# Patient Record
Sex: MALE | Race: WHITE | NOT HISPANIC OR LATINO | URBAN - METROPOLITAN AREA
[De-identification: names, ages, dates, MRNs, and addresses within clinical notes are randomized per-mention and may not be internally consistent; named-entity substitution may affect disease eponyms.]

---

## 2021-04-08 DIAGNOSIS — Z23 ENCOUNTER FOR IMMUNIZATION: ICD-10-CM

## 2025-06-19 ENCOUNTER — TELEPHONE (OUTPATIENT)
Age: 70
End: 2025-06-19

## 2025-06-19 NOTE — TELEPHONE ENCOUNTER
New Patient      Insurance   Current Insurance?BCBS   Insurance E-verified? Y    History   Reason for appointment/active symptoms?  ROSSANA, Enlarged prostate     Has the patient had any previous Urologist(s)? [facility]      Was the patient seen in the ED? N     Labs/Imaging(Including Out Of Network)? Y     Records Requested? N  Records Visible in EPIC? Y      Personal history of cancer? N     Appointment   Office location preference:  [place]  ?   Appointment Details   Date:  7/16  Time:  8:30 AM  Location:  [place]  Provider:  Gavino  Does the appointment need further review? N

## 2025-07-02 RX ORDER — ALLOPURINOL 300 MG/1
300 TABLET ORAL DAILY
COMMUNITY
Start: 2025-06-17

## 2025-07-02 RX ORDER — FLUTICASONE PROPIONATE 50 MCG
2 SPRAY, SUSPENSION (ML) NASAL DAILY
COMMUNITY

## 2025-07-02 RX ORDER — PRAVASTATIN SODIUM 20 MG
20 TABLET ORAL DAILY
COMMUNITY
Start: 2025-04-14

## 2025-07-02 RX ORDER — USTEKINUMAB 90 MG/ML
90 INJECTION, SOLUTION SUBCUTANEOUS
COMMUNITY
Start: 2025-05-09

## 2025-07-02 RX ORDER — TADALAFIL 5 MG/1
5 TABLET ORAL DAILY
COMMUNITY
Start: 2025-03-10 | End: 2026-03-05

## 2025-07-02 RX ORDER — DIAZEPAM 5 MG/1
5 TABLET ORAL EVERY 6 HOURS PRN
COMMUNITY
Start: 2025-05-20 | End: 2025-07-15 | Stop reason: ALTCHOICE

## 2025-07-02 RX ORDER — TAMSULOSIN HYDROCHLORIDE 0.4 MG/1
0.8 CAPSULE ORAL
COMMUNITY
Start: 2025-02-28 | End: 2026-02-28

## 2025-07-02 RX ORDER — LEVOTHYROXINE SODIUM 50 UG/1
50 TABLET ORAL DAILY
COMMUNITY
Start: 2025-03-10

## 2025-07-16 ENCOUNTER — OFFICE VISIT (OUTPATIENT)
Dept: UROLOGY | Facility: MEDICAL CENTER | Age: 70
End: 2025-07-16
Payer: COMMERCIAL

## 2025-07-16 VITALS
HEIGHT: 73 IN | SYSTOLIC BLOOD PRESSURE: 118 MMHG | DIASTOLIC BLOOD PRESSURE: 60 MMHG | BODY MASS INDEX: 25.71 KG/M2 | OXYGEN SATURATION: 97 % | WEIGHT: 194 LBS | HEART RATE: 77 BPM

## 2025-07-16 DIAGNOSIS — R97.20 ELEVATED PSA: ICD-10-CM

## 2025-07-16 DIAGNOSIS — N40.1 BPH WITH LOWER URINARY TRACT SYMPTOMS WITHOUT URINARY OBSTRUCTION: Primary | ICD-10-CM

## 2025-07-16 DIAGNOSIS — Z80.42 FAMILY HISTORY OF PROSTATE CANCER: ICD-10-CM

## 2025-07-16 LAB
POST-VOID RESIDUAL VOLUME, ML POC: 36 ML
SL AMB  POCT GLUCOSE, UA: ABNORMAL
SL AMB LEUKOCYTE ESTERASE,UA: ABNORMAL
SL AMB POCT BILIRUBIN,UA: ABNORMAL
SL AMB POCT BLOOD,UA: ABNORMAL
SL AMB POCT CLARITY,UA: CLEAR
SL AMB POCT COLOR,UA: YELLOW
SL AMB POCT KETONES,UA: ABNORMAL
SL AMB POCT NITRITE,UA: ABNORMAL
SL AMB POCT PH,UA: 6
SL AMB POCT SPECIFIC GRAVITY,UA: 1.02
SL AMB POCT URINE PROTEIN: ABNORMAL
SL AMB POCT UROBILINOGEN: 0.2

## 2025-07-16 PROCEDURE — 51798 US URINE CAPACITY MEASURE: CPT | Performed by: UROLOGY

## 2025-07-16 PROCEDURE — 99204 OFFICE O/P NEW MOD 45 MIN: CPT | Performed by: UROLOGY

## 2025-07-16 PROCEDURE — 81003 URINALYSIS AUTO W/O SCOPE: CPT | Performed by: UROLOGY

## 2025-07-16 NOTE — LETTER
2025     No Recipients    Patient: Lucy Antunez   YOB: 1955   Date of Visit: 2025       Dear Dr. Jaquez Recipients:    Thank you for referring Lucy Antunez to me for evaluation. Below are my notes for this consultation.    If you have questions, please do not hesitate to call me. I look forward to following your patient along with you.         Sincerely,        Ozzie Mancia MD        CC: No Recipients    Ozzie Mancia MD  2025  9:31 AM  Sign when Signing Visit  Name: Lucy Antunez      : 1955      MRN: [id number]  Encounter Provider: Ozzie Mancia MD  Encounter Date: 2025   Encounter department: [facility] UROLOGY [place]  :  Assessment & Plan  BPH with lower urinary tract symptoms without urinary obstruction  113 cc prostate on PI-RADS 2 multiparametric MRI with AUA symptom score of 17 on 0.8 mg of tamsulosin daily.  Patient would be a good candidate for HoLEP and will refer to Dr. Javed for that consideration.  Orders:  •  POCT urine dip auto non-scope  •  POCT Measure PVR  •  PSA, total and free; Future  •  Ambulatory Referral to Urology; Future    Elevated PSA  General rise in PSA over a number of years with multiparametric MRI not suspicious for malignancy.  Will repeat PSA in approximately 1 month as his last PSA was in 2025.  Orders:  •  PSA, total and free; Future    Family history of prostate cancer  Brother recently diagnosed with prostate cancer  Orders:  •  PSA, total and free; Future        History of Present Illness{?Quick Links Encounters * My Last Note * Last Note in Specialty * Snapshot * Since Last Visit * History :28065}  Lucy Antunez is a 69 y.o. male who presents with classic symptoms of BPH including nocturia 1-4 times per night frequency urgency weakening of the urinary stream.  The patient also has an elevated PSA above 6 with recent multiparametric MRI being PI-RADS 2.  The patient is biopsy  naïve with respect to the prostate.  He has a brother was recently diagnosed with prostate cancer and is going to undergo treatment.  He denies gross hematuria dysuria and urinary incontinence.  The patient is an avid bicycle rider putting in long distance riding on a weekly basis.      AUA SYMPTOM SCORE      Flowsheet Row Most Recent Value   AUA SYMPTOM SCORE    How often have you had a sensation of not emptying your bladder completely after you finished urinating? 2 (P)     How often have you had to urinate again less than two hours after you finished urinating? 3 (P)     How often have you found you stopped and started again several times when you urinate? 2 (P)     How often have you found it difficult to postpone urination? 1 (P)     How often have you had a weak urinary stream? 2 (P)     How often have you had to push or strain to begin urination? 3 (P)     How many times did you most typically get up to urinate from the time you went to bed at night until the time you got up in the morning? 4 (P)     Quality of Life: If you were to spend the rest of your life with your urinary condition just the way it is now, how would you feel about that? 3 (P)     AUA SYMPTOM SCORE 17 (P)            Review of Systems   All other systems reviewed and are negative.    Pertinent Medical History            Medical History Reviewed by provider this encounter:  Tobacco  Allergies  Meds  Problems  Med Hx  Surg Hx  Fam Hx  Soc   Hx    .  Past Medical History  Past Medical History[1]  Past Surgical History[2]  Family History[3]   reports that Lucy has never smoked. Lucy has never used smokeless tobacco. Lucy reports that Lucy does not use drugs.  Current Outpatient Medications   Medication Instructions   • allopurinol (ZYLOPRIM) 300 mg, Daily   • fluticasone (FLONASE) 50 mcg/act nasal spray 2 sprays, Daily   • levothyroxine 50 mcg, Daily   • pravastatin (PRAVACHOL) 20 mg, Daily   • semaglutide (0.25 or 0.5 mg/dose)  "(OZEMPIC (0.25 OR 0.5 MG/DOSE)) 0.25 mg, Every 7 days   • tadalafil (CIALIS) 5 mg, Daily   • tamsulosin (FLOMAX) 0.8 mg, Daily with dinner   • ustekinumab (STELARA) 90 mg, Every 3 months   Allergies[4]   Medications Ordered Prior to Encounter[5]   Social History[6]     Objective{?Quick Links Trend Vitals * Enter New Vitals * Results Review * Timeline (Adult) * Labs * Imaging * Cardiology * Procedures * Lung Cancer Screening * Surgical eConsent :80591}  /60 (BP Location: Left arm, Patient Position: Sitting, Cuff Size: Standard)   Pulse 77   Ht 6' 1\" (1.854 m)   Wt 88 kg (194 lb)   SpO2 97%   BMI 25.60 kg/m²     Physical Exam  Vitals reviewed.   Constitutional:       General: Lucy is not in acute distress.     Appearance: Normal appearance. Lucy is normal weight. Lucy is not ill-appearing, toxic-appearing or diaphoretic.   HENT:      Head: Normocephalic and atraumatic.      Nose: Nose normal.      Mouth/Throat:      Mouth: Mucous membranes are moist.     Eyes:      Extraocular Movements: Extraocular movements intact.     Pulmonary:      Effort: Pulmonary effort is normal. No respiratory distress.      Breath sounds: No wheezing, rhonchi or rales.   Abdominal:      General: There is no distension.      Palpations: Abdomen is soft.   Genitourinary:     Penis: Normal.       Testes: Normal.      Prostate: Normal.      Rectum: Normal.     Musculoskeletal:         General: Normal range of motion.      Cervical back: Neck supple.     Skin:     General: Skin is dry.     Neurological:      Mental Status: Lucy is alert and oriented to person, place, and time.     Psychiatric:         Mood and Affect: Mood normal.         Behavior: Behavior normal.         Thought Content: Thought content normal.         Judgment: Judgment normal.            Results   No results found for: \"PSA\"  Lab Results   Component Value Date    CALCIUM 10.1 05/06/2025    K 4.5 05/06/2025    CO2 28 05/06/2025     05/06/2025    BUN 26 " "05/06/2025    CREATININE 1.17 05/06/2025     No results found for: \"WBC\", \"HGB\", \"HCT\", \"MCV\", \"PLT\"    Office Urine Dip  Recent Results (from the past hour)   POCT Measure PVR    Collection Time: 07/16/25  8:49 AM   Result Value Ref Range    POST-VOID RESIDUAL VOLUME, ML POC 36 mL   POCT urine dip auto non-scope    Collection Time: 07/16/25  8:53 AM   Result Value Ref Range     COLOR,UA yellow     CLARITY,UA clear     SPECIFIC GRAVITY,UA 1.020      PH,UA 6.0     LEUKOCYTE ESTERASE,UA neg     NITRITE,UA neg     GLUCOSE, UA neg     KETONES,UA 15  mg/dl     BILIRUBIN,UA small     BLOOD,UA neg     POCT URINE PROTEIN neg     SL AMB POCT UROBILINOGEN 0.2      Radiology Results Review : No pertinent imaging studies reviewed.    {Administrative / Billing Section (Optional):58698}       [1]   Past Medical History:  Diagnosis Date   • BPH without urinary obstruction or lower urinary tract symptoms    • Hypercholesteremia    • Hypothyroid    [2]   Past Surgical History:  Procedure Laterality Date   • IR ASPIRATION BAKERS CYST Right    • KNEE ARTHROSCOPY W/ MENISCAL REPAIR     • WEILBY THUMB ARTHROPLASTY     [3]   Family History  Problem Relation Name Age of Onset   • Hypertension Brother     [4]   Allergies  Allergen Reactions   • Cephalexin Anaphylaxis   • Cephalosporins Anaphylaxis   • Cat Dander Other (See Comments)     Running nose, itching eyes   • Dog Epithelium (Canis Lupus Familiaris) Allergic Rhinitis   • Pollen Extract Allergic Rhinitis   [5]   Current Outpatient Medications on File Prior to Visit   Medication Sig Dispense Refill   • allopurinol (ZYLOPRIM) 300 mg tablet Take 300 mg by mouth daily     • fluticasone (FLONASE) 50 mcg/act nasal spray 2 sprays into each nostril daily     • levothyroxine 50 mcg tablet Take 50 mcg by mouth daily     • pravastatin (PRAVACHOL) 20 mg tablet Take 20 mg by mouth daily     • semaglutide, 0.25 or 0.5 mg/dose, (Ozempic, 0.25 or 0.5 MG/DOSE,) 2 mg/1.5 mL injection pen Inject 0.25 " mg under the skin every 7 days     • tadalafil (CIALIS) 5 MG tablet Take 5 mg by mouth daily     • tamsulosin (FLOMAX) 0.4 mg Take 0.8 mg by mouth daily with dinner     • ustekinumab (STELARA) 90 mg/mL subcutaneous injection Inject 90 mg under the skin every 3 (three) months       No current facility-administered medications on file prior to visit.   [6]   Social History  Tobacco Use   • Smoking status: Never   • Smokeless tobacco: Never   Substance and Sexual Activity   • Drug use: Never   • Sexual activity: Yes

## 2025-07-16 NOTE — PROGRESS NOTES
Name: Lucy Antunez      : 1955      MRN: [id number]  Encounter Provider: Ozzie Mancia MD  Encounter Date: 2025   Encounter department: [facility] UROLOGY [place]  :  Assessment & Plan  BPH with lower urinary tract symptoms without urinary obstruction  113 cc prostate on PI-RADS 2 multiparametric MRI with AUA symptom score of 17 on 0.8 mg of tamsulosin daily.  Patient would be a good candidate for HoLEP and will refer to Dr. Javed for that consideration.  Orders:    POCT urine dip auto non-scope    POCT Measure PVR    PSA, total and free; Future    Ambulatory Referral to Urology; Future    Elevated PSA  General rise in PSA over a number of years with multiparametric MRI not suspicious for malignancy.  Will repeat PSA in approximately 1 month as his last PSA was in 2025.  Orders:    PSA, total and free; Future    Family history of prostate cancer  Brother recently diagnosed with prostate cancer  Orders:    PSA, total and free; Future        History of Present Illness   Lucy Antunez is a 69 y.o. male who presents with classic symptoms of BPH including nocturia 1-4 times per night frequency urgency weakening of the urinary stream.  The patient also has an elevated PSA above 6 with recent multiparametric MRI being PI-RADS 2.  The patient is biopsy naïve with respect to the prostate.  He has a brother was recently diagnosed with prostate cancer and is going to undergo treatment.  He denies gross hematuria dysuria and urinary incontinence.  The patient is an avid bicycle rider putting in long distance riding on a weekly basis.      AUA SYMPTOM SCORE      Flowsheet Row Most Recent Value   AUA SYMPTOM SCORE    How often have you had a sensation of not emptying your bladder completely after you finished urinating? 2 (P)     How often have you had to urinate again less than two hours after you finished urinating? 3 (P)     How often have you found you stopped and started again  "several times when you urinate? 2 (P)     How often have you found it difficult to postpone urination? 1 (P)     How often have you had a weak urinary stream? 2 (P)     How often have you had to push or strain to begin urination? 3 (P)     How many times did you most typically get up to urinate from the time you went to bed at night until the time you got up in the morning? 4 (P)     Quality of Life: If you were to spend the rest of your life with your urinary condition just the way it is now, how would you feel about that? 3 (P)     AUA SYMPTOM SCORE 17 (P)            Review of Systems   All other systems reviewed and are negative.    Pertinent Medical History             Medical History Reviewed by provider this encounter:  Tobacco  Allergies  Meds  Problems  Med Hx  Surg Hx  Fam Hx  Soc   Hx    .  Past Medical History   Past Medical History[1]  Past Surgical History[2]  Family History[3]   reports that Lucy has never smoked. Lucy has never used smokeless tobacco. Lucy reports that Lucy does not use drugs.  Current Outpatient Medications   Medication Instructions    allopurinol (ZYLOPRIM) 300 mg, Daily    fluticasone (FLONASE) 50 mcg/act nasal spray 2 sprays, Daily    levothyroxine 50 mcg, Daily    pravastatin (PRAVACHOL) 20 mg, Daily    semaglutide (0.25 or 0.5 mg/dose) (OZEMPIC (0.25 OR 0.5 MG/DOSE)) 0.25 mg, Every 7 days    tadalafil (CIALIS) 5 mg, Daily    tamsulosin (FLOMAX) 0.8 mg, Daily with dinner    ustekinumab (STELARA) 90 mg, Every 3 months   Allergies[4]   Medications Ordered Prior to Encounter[5]   Social History[6]     Objective   /60 (BP Location: Left arm, Patient Position: Sitting, Cuff Size: Standard)   Pulse 77   Ht 6' 1\" (1.854 m)   Wt 88 kg (194 lb)   SpO2 97%   BMI 25.60 kg/m²     Physical Exam  Vitals reviewed.   Constitutional:       General: Lucy is not in acute distress.     Appearance: Normal appearance. Lucy is normal weight. Lucy is not ill-appearing, " "toxic-appearing or diaphoretic.   HENT:      Head: Normocephalic and atraumatic.      Nose: Nose normal.      Mouth/Throat:      Mouth: Mucous membranes are moist.     Eyes:      Extraocular Movements: Extraocular movements intact.     Pulmonary:      Effort: Pulmonary effort is normal. No respiratory distress.      Breath sounds: No wheezing, rhonchi or rales.   Abdominal:      General: There is no distension.      Palpations: Abdomen is soft.   Genitourinary:     Penis: Normal.       Testes: Normal.      Prostate: Normal.      Rectum: Normal.     Musculoskeletal:         General: Normal range of motion.      Cervical back: Neck supple.     Skin:     General: Skin is dry.     Neurological:      Mental Status: Lucy is alert and oriented to person, place, and time.     Psychiatric:         Mood and Affect: Mood normal.         Behavior: Behavior normal.         Thought Content: Thought content normal.         Judgment: Judgment normal.            Results   No results found for: \"PSA\"  Lab Results   Component Value Date    CALCIUM 10.1 05/06/2025    K 4.5 05/06/2025    CO2 28 05/06/2025     05/06/2025    BUN 26 05/06/2025    CREATININE 1.17 05/06/2025     No results found for: \"WBC\", \"HGB\", \"HCT\", \"MCV\", \"PLT\"    Office Urine Dip  Recent Results (from the past hour)   POCT Measure PVR    Collection Time: 07/16/25  8:49 AM   Result Value Ref Range    POST-VOID RESIDUAL VOLUME, ML POC 36 mL   POCT urine dip auto non-scope    Collection Time: 07/16/25  8:53 AM   Result Value Ref Range     COLOR,UA yellow     CLARITY,UA clear     SPECIFIC GRAVITY,UA 1.020      PH,UA 6.0     LEUKOCYTE ESTERASE,UA neg     NITRITE,UA neg     GLUCOSE, UA neg     KETONES,UA 15  mg/dl     BILIRUBIN,UA small     BLOOD,UA neg     POCT URINE PROTEIN neg     SL AMB POCT UROBILINOGEN 0.2      Radiology Results Review : No pertinent imaging studies reviewed.           [1]   Past Medical History:  Diagnosis Date    BPH without urinary " obstruction or lower urinary tract symptoms     Hypercholesteremia     Hypothyroid    [2]   Past Surgical History:  Procedure Laterality Date    IR ASPIRATION BAKERS CYST Right     KNEE ARTHROSCOPY W/ MENISCAL REPAIR      WEILBY THUMB ARTHROPLASTY     [3]   Family History  Problem Relation Name Age of Onset    Hypertension Brother     [4]   Allergies  Allergen Reactions    Cephalexin Anaphylaxis    Cephalosporins Anaphylaxis    Cat Dander Other (See Comments)     Running nose, itching eyes    Dog Epithelium (Canis Lupus Familiaris) Allergic Rhinitis    Pollen Extract Allergic Rhinitis   [5]   Current Outpatient Medications on File Prior to Visit   Medication Sig Dispense Refill    allopurinol (ZYLOPRIM) 300 mg tablet Take 300 mg by mouth daily      fluticasone (FLONASE) 50 mcg/act nasal spray 2 sprays into each nostril daily      levothyroxine 50 mcg tablet Take 50 mcg by mouth daily      pravastatin (PRAVACHOL) 20 mg tablet Take 20 mg by mouth daily      semaglutide, 0.25 or 0.5 mg/dose, (Ozempic, 0.25 or 0.5 MG/DOSE,) 2 mg/1.5 mL injection pen Inject 0.25 mg under the skin every 7 days      tadalafil (CIALIS) 5 MG tablet Take 5 mg by mouth daily      tamsulosin (FLOMAX) 0.4 mg Take 0.8 mg by mouth daily with dinner      ustekinumab (STELARA) 90 mg/mL subcutaneous injection Inject 90 mg under the skin every 3 (three) months       No current facility-administered medications on file prior to visit.   [6]   Social History  Tobacco Use    Smoking status: Never    Smokeless tobacco: Never   Substance and Sexual Activity    Drug use: Never    Sexual activity: Yes

## 2025-08-18 ENCOUNTER — APPOINTMENT (OUTPATIENT)
Dept: LAB | Facility: CLINIC | Age: 70
End: 2025-08-18
Payer: COMMERCIAL

## 2025-08-18 DIAGNOSIS — Z80.42 FAMILY HISTORY OF PROSTATE CANCER: ICD-10-CM

## 2025-08-18 DIAGNOSIS — N40.1 BPH WITH LOWER URINARY TRACT SYMPTOMS WITHOUT URINARY OBSTRUCTION: ICD-10-CM

## 2025-08-18 DIAGNOSIS — R97.20 ELEVATED PSA: ICD-10-CM

## 2025-08-18 LAB
PSA FREE MFR SERPL: 24.29 %
PSA FREE SERPL-MCNC: 1.47 NG/ML
PSA SERPL-MCNC: 6.07 NG/ML (ref 0–4)

## 2025-08-18 PROCEDURE — 84154 ASSAY OF PSA FREE: CPT

## 2025-08-18 PROCEDURE — 84153 ASSAY OF PSA TOTAL: CPT

## 2025-08-18 PROCEDURE — 36415 COLL VENOUS BLD VENIPUNCTURE: CPT
